# Patient Record
Sex: FEMALE | Race: WHITE
[De-identification: names, ages, dates, MRNs, and addresses within clinical notes are randomized per-mention and may not be internally consistent; named-entity substitution may affect disease eponyms.]

---

## 2020-07-07 ENCOUNTER — HOSPITAL ENCOUNTER (EMERGENCY)
Dept: HOSPITAL 26 - MED | Age: 65
Discharge: HOME | End: 2020-07-07
Payer: COMMERCIAL

## 2020-07-07 VITALS — HEIGHT: 63 IN | WEIGHT: 172 LBS | BODY MASS INDEX: 30.48 KG/M2

## 2020-07-07 VITALS — SYSTOLIC BLOOD PRESSURE: 136 MMHG | DIASTOLIC BLOOD PRESSURE: 59 MMHG

## 2020-07-07 DIAGNOSIS — I10: ICD-10-CM

## 2020-07-07 DIAGNOSIS — Z79.4: ICD-10-CM

## 2020-07-07 DIAGNOSIS — E11.9: ICD-10-CM

## 2020-07-07 DIAGNOSIS — Z79.899: ICD-10-CM

## 2020-07-07 DIAGNOSIS — N12: Primary | ICD-10-CM

## 2020-07-07 LAB
ALBUMIN FLD-MCNC: 3 G/DL (ref 3.4–5)
ANION GAP SERPL CALCULATED.3IONS-SCNC: 12.3 MMOL/L (ref 8–16)
APPEARANCE UR: CLEAR
AST SERPL-CCNC: 33 U/L (ref 15–37)
BASOPHILS # BLD AUTO: 0.1 K/UL (ref 0–0.22)
BASOPHILS NFR BLD AUTO: 1.2 % (ref 0–2)
BILIRUB SERPL-MCNC: 0.4 MG/DL (ref 0–1)
BILIRUB UR QL STRIP: (no result)
BUN SERPL-MCNC: 21 MG/DL (ref 7–18)
CHLORIDE SERPL-SCNC: 102 MMOL/L (ref 98–107)
CO2 SERPL-SCNC: 27.4 MMOL/L (ref 21–32)
COLOR UR: (no result)
CREAT SERPL-MCNC: 1.4 MG/DL (ref 0.6–1.3)
EOSINOPHIL # BLD AUTO: 0 K/UL (ref 0–0.4)
EOSINOPHIL NFR BLD AUTO: 0 % (ref 0–4)
ERYTHROCYTE [DISTWIDTH] IN BLOOD BY AUTOMATED COUNT: 15.2 % (ref 11.6–13.7)
GFR SERPL CREATININE-BSD FRML MDRD: 48 ML/MIN (ref 90–?)
GLUCOSE SERPL-MCNC: 156 MG/DL (ref 74–106)
GLUCOSE UR STRIP-MCNC: (no result) MG/DL
HCT VFR BLD AUTO: 38.9 % (ref 36–48)
HGB BLD-MCNC: 12.5 G/DL (ref 12–16)
HGB UR QL STRIP: NEGATIVE
LEUKOCYTE ESTERASE UR QL STRIP: (no result)
LYMPHOCYTES # BLD AUTO: 0.6 K/UL (ref 2.5–16.5)
LYMPHOCYTES NFR BLD AUTO: 11.3 % (ref 20.5–51.1)
MCH RBC QN AUTO: 27 PG (ref 27–31)
MCHC RBC AUTO-ENTMCNC: 32 G/DL (ref 33–37)
MCV RBC AUTO: 82.3 FL (ref 80–94)
MONOCYTES # BLD AUTO: 0.4 K/UL (ref 0.8–1)
MONOCYTES NFR BLD AUTO: 8.4 % (ref 1.7–9.3)
NEUTROPHILS # BLD AUTO: 4 K/UL (ref 1.8–7.7)
NEUTROPHILS NFR BLD AUTO: 79.1 % (ref 42.2–75.2)
NITRITE UR QL STRIP: POSITIVE
PH UR STRIP: 5.5 [PH] (ref 5–9)
PLATELET # BLD AUTO: 327 K/UL (ref 140–450)
POTASSIUM SERPL-SCNC: 4.7 MMOL/L (ref 3.5–5.1)
RBC # BLD AUTO: 4.72 MIL/UL (ref 4.2–5.4)
RBC #/AREA URNS HPF: (no result) /HPF (ref 0–5)
SODIUM SERPL-SCNC: 137 MMOL/L (ref 136–145)
WBC # BLD AUTO: 5 K/UL (ref 4.8–10.8)
WBC,URINE: (no result) /HPF (ref 0–5)

## 2020-07-07 NOTE — NUR
Patient discharged with v/s stable. Written and verbal after care instructions 
given and explained. 

Patient alert, oriented and verbalized understanding of instructions. 
Ambulatory with steady gait. All questions addressed prior to discharge. ID 
band removed. Patient advised to follow up with PMD. Rx of Cephalexin 500mg 
given. Patient educated on indication of medication including possible reaction 
and side effects. Opportunity to ask questions provided and answered.

## 2020-07-07 NOTE — NUR
64 Y/O FEMALE FROM HOME C/O LT FLANK PAIN, WAS SEEN AT PRIMARY CARE AND GIVEN 
CIPRO AND PYRIDIUM YESTERDAY FOR UTI. DENIES DYSURIA/URINARY FREQUENCY. DENIES 
FEVER/N/V/D. 9/10 ACHING PAIN TO FLANK. ABD SOFT, ROUND, NONTENDER. VSS 



MEDHX: DM

## 2020-10-16 ENCOUNTER — HOSPITAL ENCOUNTER (EMERGENCY)
Dept: HOSPITAL 26 - MED | Age: 65
Discharge: HOME | End: 2020-10-16
Payer: COMMERCIAL

## 2020-10-16 VITALS — HEIGHT: 63 IN | WEIGHT: 179 LBS | BODY MASS INDEX: 31.71 KG/M2

## 2020-10-16 VITALS — DIASTOLIC BLOOD PRESSURE: 84 MMHG | SYSTOLIC BLOOD PRESSURE: 172 MMHG

## 2020-10-16 VITALS — SYSTOLIC BLOOD PRESSURE: 172 MMHG | DIASTOLIC BLOOD PRESSURE: 84 MMHG

## 2020-10-16 DIAGNOSIS — E11.9: ICD-10-CM

## 2020-10-16 DIAGNOSIS — I10: ICD-10-CM

## 2020-10-16 DIAGNOSIS — L03.115: Primary | ICD-10-CM

## 2020-10-16 PROCEDURE — 93971 EXTREMITY STUDY: CPT

## 2020-10-16 PROCEDURE — 99284 EMERGENCY DEPT VISIT MOD MDM: CPT

## 2020-10-16 NOTE — NUR
DPatient discharged with v/s stable. Written and verbal after care instructions 
about cellulitis given and explained. 

Patient alert, oriented and verbalized understanding of instructions. 
Ambulatory with steady gait. All questions addressed prior to discharge. ID 
band removed. Patient advised to follow up with PMD. Rx of bactrim and keflex 
given. Patient educated on indication of medication including possible reaction 
and side effects. Opportunity to ask questions provided and answered.

## 2020-10-18 ENCOUNTER — HOSPITAL ENCOUNTER (EMERGENCY)
Dept: HOSPITAL 26 - MED | Age: 65
Discharge: HOME | End: 2020-10-18
Payer: COMMERCIAL

## 2020-10-18 VITALS — SYSTOLIC BLOOD PRESSURE: 139 MMHG | DIASTOLIC BLOOD PRESSURE: 63 MMHG

## 2020-10-18 VITALS — HEIGHT: 61 IN | BODY MASS INDEX: 32.1 KG/M2 | WEIGHT: 170 LBS

## 2020-10-18 VITALS — DIASTOLIC BLOOD PRESSURE: 63 MMHG | SYSTOLIC BLOOD PRESSURE: 139 MMHG

## 2020-10-18 DIAGNOSIS — E11.65: ICD-10-CM

## 2020-10-18 DIAGNOSIS — L03.115: Primary | ICD-10-CM

## 2020-10-18 DIAGNOSIS — Z79.4: ICD-10-CM

## 2020-10-18 DIAGNOSIS — I10: ICD-10-CM

## 2020-10-18 DIAGNOSIS — Z79.899: ICD-10-CM

## 2020-10-18 NOTE — NUR
Patient assesed and treated by DUKE Quiñones. Pt discharged with v/s stable. Written 
and verbal after care instructions about wound check given and explained. 

Patient alert, oriented and verbalized understanding of instructions. 
Ambulatory with steady gait. All questions addressed prior to discharge. ID 
band removed. Patient advised to follow up with PMD. Rx of keflex given. 
Patient educated on indication of medication including possible reaction and 
side effects. Opportunity to ask questions provided and answered.

## 2020-10-18 NOTE — NUR
-------------------------------------------------------------------------------

            *** Note dajaone in EDM - 10/18/20 at 1532 by MEDTOMÁS ***             

-------------------------------------------------------------------------------

Patient discharged with v/s stable. Written and verbal after care instructions 
about wound check given and explained. 

Patient alert, oriented and verbalized understanding of instructions. 
Ambulatory with steady gait. All questions addressed prior to discharge. ID 
band removed. Patient advised to follow up with PMD. Rx of keflex given. 
Patient educated on indication of medication including possible reaction and 
side effects. Opportunity to ask questions provided and answered.

## 2022-04-08 ENCOUNTER — HOSPITAL ENCOUNTER (EMERGENCY)
Dept: HOSPITAL 26 - MED | Age: 67
Discharge: HOME | End: 2022-04-08
Payer: COMMERCIAL

## 2022-04-08 VITALS — BODY MASS INDEX: 31.18 KG/M2 | WEIGHT: 176 LBS | HEIGHT: 63 IN

## 2022-04-08 VITALS — DIASTOLIC BLOOD PRESSURE: 58 MMHG | SYSTOLIC BLOOD PRESSURE: 155 MMHG

## 2022-04-08 VITALS — DIASTOLIC BLOOD PRESSURE: 61 MMHG | SYSTOLIC BLOOD PRESSURE: 141 MMHG

## 2022-04-08 DIAGNOSIS — R10.84: ICD-10-CM

## 2022-04-08 DIAGNOSIS — I10: ICD-10-CM

## 2022-04-08 DIAGNOSIS — E11.9: ICD-10-CM

## 2022-04-08 DIAGNOSIS — Z79.899: ICD-10-CM

## 2022-04-08 DIAGNOSIS — Z79.4: ICD-10-CM

## 2022-04-08 DIAGNOSIS — K59.00: Primary | ICD-10-CM

## 2022-04-08 DIAGNOSIS — R11.0: ICD-10-CM

## 2022-04-08 LAB
ALBUMIN FLD-MCNC: 3.5 G/DL (ref 3.4–5)
ANION GAP SERPL CALCULATED.3IONS-SCNC: 12.4 MMOL/L (ref 8–16)
AST SERPL-CCNC: 14 U/L (ref 15–37)
BASOPHILS # BLD AUTO: 0 K/UL (ref 0–0.22)
BASOPHILS NFR BLD AUTO: 0.5 % (ref 0–2)
BILIRUB SERPL-MCNC: 0.7 MG/DL (ref 0–1)
BUN SERPL-MCNC: 20 MG/DL (ref 7–18)
CHLORIDE SERPL-SCNC: 106 MMOL/L (ref 98–107)
CO2 SERPL-SCNC: 24 MMOL/L (ref 21–32)
CREAT SERPL-MCNC: 0.6 MG/DL (ref 0.6–1.3)
EOSINOPHIL # BLD AUTO: 0.2 K/UL (ref 0–0.4)
EOSINOPHIL NFR BLD AUTO: 2.3 % (ref 0–4)
ERYTHROCYTE [DISTWIDTH] IN BLOOD BY AUTOMATED COUNT: 14.5 % (ref 11.6–13.7)
GFR SERPL CREATININE-BSD FRML MDRD: 128 ML/MIN (ref 90–?)
GLUCOSE SERPL-MCNC: 136 MG/DL (ref 74–106)
HCT VFR BLD AUTO: 39.7 % (ref 36–48)
HGB BLD-MCNC: 12.8 G/DL (ref 12–16)
LIPASE SERPL-CCNC: 129 U/L (ref 73–393)
LYMPHOCYTES # BLD AUTO: 1.1 K/UL (ref 2.5–16.5)
LYMPHOCYTES NFR BLD AUTO: 15 % (ref 20.5–51.1)
MCH RBC QN AUTO: 26 PG (ref 27–31)
MCHC RBC AUTO-ENTMCNC: 32 G/DL (ref 33–37)
MCV RBC AUTO: 81.7 FL (ref 80–94)
MONOCYTES # BLD AUTO: 0.5 K/UL (ref 0.8–1)
MONOCYTES NFR BLD AUTO: 6.9 % (ref 1.7–9.3)
NEUTROPHILS # BLD AUTO: 5.7 K/UL (ref 1.8–7.7)
NEUTROPHILS NFR BLD AUTO: 75.3 % (ref 42.2–75.2)
PLATELET # BLD AUTO: 315 K/UL (ref 140–450)
POTASSIUM SERPL-SCNC: 4.4 MMOL/L (ref 3.5–5.1)
RBC # BLD AUTO: 4.86 MIL/UL (ref 4.2–5.4)
SODIUM SERPL-SCNC: 138 MMOL/L (ref 136–145)
WBC # BLD AUTO: 7.5 K/UL (ref 4.8–10.8)

## 2022-04-08 PROCEDURE — 96372 THER/PROPH/DIAG INJ SC/IM: CPT

## 2022-04-08 PROCEDURE — 99283 EMERGENCY DEPT VISIT LOW MDM: CPT

## 2022-04-08 PROCEDURE — 83690 ASSAY OF LIPASE: CPT

## 2022-04-08 PROCEDURE — 36415 COLL VENOUS BLD VENIPUNCTURE: CPT

## 2022-04-08 PROCEDURE — 85025 COMPLETE CBC W/AUTO DIFF WBC: CPT

## 2022-04-08 PROCEDURE — 80053 COMPREHEN METABOLIC PANEL: CPT

## 2022-04-08 NOTE — NUR
65 Y/O FEMALE C/O CONSTIPATION AND NAUSEA X 1 WEEK. PT DENIES ABDOMINAL PAIN, 
VOMITING. PT STATES LAST BOWEL MOVEMENT WAS 1 WEEK AGO. PT DENIES SOB, CHEST 
PAIN, FEVER OR CHILLS. PT DENIES TAKING MEDICATION PRIOR TO ARRIVAL. PT IS 
ALERT AND ORIENTED X4. BED IN LOWEST POSITION. BED RAIL X1.





PMH: HTN, DM

MEDS: INSULIN, SIMVASTATIN

NKA

## 2022-04-08 NOTE — NUR
Patient discharged with v/s stable. Written and verbal after care instructions 
given and explained. 

Patient alert, oriented and verbalized understanding of instructions. 
Ambulatory with steady gait. All questions addressed prior to discharge. ID 
band removed. Patient advised to follow up with PMD. Rx of 
COLACE,MIRALAX,NAPROSYN given. Patient educated on indication of medication 
including possible reaction and side effects. Opportunity to ask questions 
provided and answered.

## 2022-05-03 ENCOUNTER — HOSPITAL ENCOUNTER (INPATIENT)
Dept: HOSPITAL 26 - MED | Age: 67
LOS: 3 days | Discharge: ANOTHER HOSPITAL | DRG: 280 | End: 2022-05-06
Attending: FAMILY MEDICINE | Admitting: FAMILY MEDICINE
Payer: COMMERCIAL

## 2022-05-03 VITALS — DIASTOLIC BLOOD PRESSURE: 65 MMHG | SYSTOLIC BLOOD PRESSURE: 152 MMHG

## 2022-05-03 VITALS — WEIGHT: 182 LBS | BODY MASS INDEX: 33.49 KG/M2 | HEIGHT: 62 IN

## 2022-05-03 VITALS — SYSTOLIC BLOOD PRESSURE: 105 MMHG | DIASTOLIC BLOOD PRESSURE: 78 MMHG

## 2022-05-03 DIAGNOSIS — J96.01: ICD-10-CM

## 2022-05-03 DIAGNOSIS — E44.1: ICD-10-CM

## 2022-05-03 DIAGNOSIS — E11.65: ICD-10-CM

## 2022-05-03 DIAGNOSIS — D64.9: ICD-10-CM

## 2022-05-03 DIAGNOSIS — I21.4: Primary | ICD-10-CM

## 2022-05-03 DIAGNOSIS — E87.1: ICD-10-CM

## 2022-05-03 DIAGNOSIS — I50.31: ICD-10-CM

## 2022-05-03 DIAGNOSIS — Z20.822: ICD-10-CM

## 2022-05-03 DIAGNOSIS — I11.0: ICD-10-CM

## 2022-05-03 DIAGNOSIS — E83.39: ICD-10-CM

## 2022-05-03 DIAGNOSIS — E78.5: ICD-10-CM

## 2022-05-03 DIAGNOSIS — D68.9: ICD-10-CM

## 2022-05-03 DIAGNOSIS — I25.10: ICD-10-CM

## 2022-05-03 LAB
ALBUMIN FLD-MCNC: 3.2 G/DL (ref 3.4–5)
AMYLASE SERPL-CCNC: 11 U/L (ref 25–115)
ANION GAP SERPL CALCULATED.3IONS-SCNC: 12 MMOL/L (ref 8–16)
AST SERPL-CCNC: 62 U/L (ref 15–37)
BASOPHILS # BLD AUTO: 0 K/UL (ref 0–0.22)
BASOPHILS NFR BLD AUTO: 0.3 % (ref 0–2)
BILIRUB SERPL-MCNC: 0.8 MG/DL (ref 0–1)
BUN SERPL-MCNC: 16 MG/DL (ref 7–18)
CHLORIDE SERPL-SCNC: 99 MMOL/L (ref 98–107)
CHOLEST/HDLC SERPL: 3.6 {RATIO} (ref 1–4.5)
CO2 SERPL-SCNC: 27.5 MMOL/L (ref 21–32)
CREAT SERPL-MCNC: 0.8 MG/DL (ref 0.6–1.3)
EOSINOPHIL # BLD AUTO: 0.1 K/UL (ref 0–0.4)
EOSINOPHIL NFR BLD AUTO: 1.1 % (ref 0–4)
ERYTHROCYTE [DISTWIDTH] IN BLOOD BY AUTOMATED COUNT: 14.9 % (ref 11.6–13.7)
GFR SERPL CREATININE-BSD FRML MDRD: 92 ML/MIN (ref 90–?)
GLUCOSE SERPL-MCNC: 405 MG/DL (ref 74–106)
HCT VFR BLD AUTO: 37.4 % (ref 36–48)
HDLC SERPL-MCNC: 51 MG/DL (ref 40–60)
HGB BLD-MCNC: 12.1 G/DL (ref 12–16)
LDLC SERPL CALC-MCNC: 115 MG/DL (ref 60–100)
LIPASE SERPL-CCNC: 41 U/L (ref 73–393)
LIPASE SERPL-CCNC: 46 U/L (ref 73–393)
LYMPHOCYTES # BLD AUTO: 0.6 K/UL (ref 2.5–16.5)
LYMPHOCYTES NFR BLD AUTO: 7.1 % (ref 20.5–51.1)
MAGNESIUM SERPL-MCNC: 2.1 MG/DL (ref 1.8–2.4)
MCH RBC QN AUTO: 27 PG (ref 27–31)
MCHC RBC AUTO-ENTMCNC: 32 G/DL (ref 33–37)
MCV RBC AUTO: 81.9 FL (ref 80–94)
MONOCYTES # BLD AUTO: 0.5 K/UL (ref 0.8–1)
MONOCYTES NFR BLD AUTO: 5.9 % (ref 1.7–9.3)
NEUTROPHILS # BLD AUTO: 7.7 K/UL (ref 1.8–7.7)
NEUTROPHILS NFR BLD AUTO: 85.6 % (ref 42.2–75.2)
PHOSPHATE SERPL-MCNC: 6.1 MG/DL (ref 2.5–4.9)
PLATELET # BLD AUTO: 339 K/UL (ref 140–450)
POTASSIUM SERPL-SCNC: 4.5 MMOL/L (ref 3.5–5.1)
PROTHROMBIN TIME: 9.6 SECS (ref 10.8–13.4)
RBC # BLD AUTO: 4.57 MIL/UL (ref 4.2–5.4)
SODIUM SERPL-SCNC: 134 MMOL/L (ref 136–145)
T4 FREE SERPL-MCNC: 1.08 NG/DL (ref 0.76–1.46)
TRIGL SERPL-MCNC: 92 MG/DL (ref 30–150)
TSH SERPL DL<=0.05 MIU/L-ACNC: 2.77 UIU/ML (ref 0.34–3.74)
WBC # BLD AUTO: 9.1 K/UL (ref 4.8–10.8)

## 2022-05-03 PROCEDURE — 5A0935A ASSISTANCE WITH RESPIRATORY VENTILATION, LESS THAN 24 CONSECUTIVE HOURS, HIGH NASAL FLOW/VELOCITY: ICD-10-PCS

## 2022-05-03 NOTE — NUR
PATIENT AT NOW REST. PATIENT FEELS BETTER BUT STILL HAS DIFFICULTY CATCHING 
BREATH. SLIGHTLY TACHYCARDIC, AND INCREASED RR.

## 2022-05-03 NOTE — NUR
patient desaturating to 84% on 6L NC. called for RT. placed on simple face 
mask. patient diaphoretic, "feels desperation to breathe", and "i can't calm 
down". ERMD made aware.

## 2022-05-03 NOTE — NUR
PATIENT WAS ABLE TO CALM DOWN BY THERAPEUTIC COMMUNICATION AND TOUCH. BUT FEELS 
DISCOMFORT IN THE ABDOMEN. PATIENT HAS INCREASE HEART RATE, INCREASE RR, AND 
OXYGEN AT 89% 4L NC. ERMD MADE AWARE.

## 2022-05-03 NOTE — NUR
patient back to the bathroom and hooked patient back on the monitor. safety 
measures are in place, and will continue to monitor patient. patient aaox4 and 
c/o difficulty breathing upon exertion. ERMD made aware

## 2022-05-03 NOTE — NUR
patient bib self c/o c/p, sob, ha started thursday. patient clammy. has taken 
tylenol and ibuprofen with no relief. difficulty breathing upon exertion- has 
trouble catching breath.



pmh: high cholesterol, DM, htn

nka

## 2022-05-04 VITALS — DIASTOLIC BLOOD PRESSURE: 74 MMHG | SYSTOLIC BLOOD PRESSURE: 138 MMHG

## 2022-05-04 VITALS — SYSTOLIC BLOOD PRESSURE: 121 MMHG | DIASTOLIC BLOOD PRESSURE: 73 MMHG

## 2022-05-04 VITALS — SYSTOLIC BLOOD PRESSURE: 120 MMHG | DIASTOLIC BLOOD PRESSURE: 68 MMHG

## 2022-05-04 VITALS — SYSTOLIC BLOOD PRESSURE: 120 MMHG | DIASTOLIC BLOOD PRESSURE: 69 MMHG

## 2022-05-04 VITALS — DIASTOLIC BLOOD PRESSURE: 68 MMHG | SYSTOLIC BLOOD PRESSURE: 120 MMHG

## 2022-05-04 VITALS — DIASTOLIC BLOOD PRESSURE: 70 MMHG | SYSTOLIC BLOOD PRESSURE: 142 MMHG

## 2022-05-04 VITALS — DIASTOLIC BLOOD PRESSURE: 56 MMHG | SYSTOLIC BLOOD PRESSURE: 110 MMHG

## 2022-05-04 LAB
ANION GAP SERPL CALCULATED.3IONS-SCNC: 13 MMOL/L (ref 8–16)
APPEARANCE UR: (no result)
BARBITURATES UR QL SCN: NEGATIVE NG/ML
BASOPHILS # BLD AUTO: 0 K/UL (ref 0–0.22)
BASOPHILS NFR BLD AUTO: 0.4 % (ref 0–2)
BENZODIAZ UR QL SCN: NEGATIVE NG/ML
BILIRUB UR QL STRIP: NEGATIVE
BUN SERPL-MCNC: 15 MG/DL (ref 7–18)
BZE UR QL SCN: NEGATIVE NG/ML
CANNABINOIDS UR QL SCN: NEGATIVE NG/ML
CHLORIDE SERPL-SCNC: 101 MMOL/L (ref 98–107)
CO2 SERPL-SCNC: 28.3 MMOL/L (ref 21–32)
COLOR UR: (no result)
CREAT SERPL-MCNC: 0.8 MG/DL (ref 0.6–1.3)
EOSINOPHIL # BLD AUTO: 0 K/UL (ref 0–0.4)
EOSINOPHIL NFR BLD AUTO: 0.5 % (ref 0–4)
ERYTHROCYTE [DISTWIDTH] IN BLOOD BY AUTOMATED COUNT: 14.6 % (ref 11.6–13.7)
GFR SERPL CREATININE-BSD FRML MDRD: 92 ML/MIN (ref 90–?)
GLUCOSE SERPL-MCNC: 333 MG/DL (ref 74–106)
GLUCOSE UR STRIP-MCNC: (no result) MG/DL
HCT VFR BLD AUTO: 33.9 % (ref 36–48)
HGB BLD-MCNC: 11 G/DL (ref 12–16)
HGB UR QL STRIP: NEGATIVE
LEUKOCYTE ESTERASE UR QL STRIP: NEGATIVE
LYMPHOCYTES # BLD AUTO: 1 K/UL (ref 2.5–16.5)
LYMPHOCYTES NFR BLD AUTO: 11.3 % (ref 20.5–51.1)
MCH RBC QN AUTO: 27 PG (ref 27–31)
MCHC RBC AUTO-ENTMCNC: 32 G/DL (ref 33–37)
MCV RBC AUTO: 81.9 FL (ref 80–94)
MONOCYTES # BLD AUTO: 0.6 K/UL (ref 0.8–1)
MONOCYTES NFR BLD AUTO: 6.7 % (ref 1.7–9.3)
NEUTROPHILS # BLD AUTO: 7.1 K/UL (ref 1.8–7.7)
NEUTROPHILS NFR BLD AUTO: 81.1 % (ref 42.2–75.2)
NITRITE UR QL STRIP: NEGATIVE
OPIATES UR QL SCN: NEGATIVE NG/ML
PCP UR QL SCN: NEGATIVE NG/ML
PH UR STRIP: 5.5 [PH] (ref 5–9)
PLATELET # BLD AUTO: 318 K/UL (ref 140–450)
POTASSIUM SERPL-SCNC: 4.3 MMOL/L (ref 3.5–5.1)
RBC # BLD AUTO: 4.14 MIL/UL (ref 4.2–5.4)
SODIUM SERPL-SCNC: 138 MMOL/L (ref 136–145)
WBC # BLD AUTO: 8.7 K/UL (ref 4.8–10.8)

## 2022-05-04 RX ADMIN — INSULIN LISPRO PRN UNITS: 100 INJECTION, SOLUTION INTRAVENOUS; SUBCUTANEOUS at 20:37

## 2022-05-04 RX ADMIN — INSULIN LISPRO PRN UNITS: 100 INJECTION, SOLUTION INTRAVENOUS; SUBCUTANEOUS at 17:10

## 2022-05-04 RX ADMIN — Medication SCH MG: at 20:50

## 2022-05-04 RX ADMIN — INSULIN GLARGINE SCH UNITS: 100 INJECTION, SOLUTION SUBCUTANEOUS at 20:43

## 2022-05-04 RX ADMIN — PANTOPRAZOLE SODIUM SCH MG: 40 TABLET, DELAYED RELEASE ORAL at 09:08

## 2022-05-04 RX ADMIN — Medication SCH DEV: at 07:04

## 2022-05-04 RX ADMIN — HEPARIN SODIUM SCH MLS/HR: 5000 INJECTION, SOLUTION INTRAVENOUS at 16:58

## 2022-05-04 RX ADMIN — INSULIN LISPRO PRN UNITS: 100 INJECTION, SOLUTION INTRAVENOUS; SUBCUTANEOUS at 12:38

## 2022-05-04 RX ADMIN — HEPARIN SODIUM SCH MLS/HR: 5000 INJECTION, SOLUTION INTRAVENOUS at 10:15

## 2022-05-04 RX ADMIN — HEPARIN SODIUM SCH MLS/HR: 5000 INJECTION, SOLUTION INTRAVENOUS at 21:59

## 2022-05-04 RX ADMIN — Medication SCH DEV: at 17:09

## 2022-05-04 RX ADMIN — INSULIN LISPRO PRN UNITS: 100 INJECTION, SOLUTION INTRAVENOUS; SUBCUTANEOUS at 07:02

## 2022-05-04 RX ADMIN — HEPARIN SODIUM SCH MLS/HR: 5000 INJECTION, SOLUTION INTRAVENOUS at 02:39

## 2022-05-04 RX ADMIN — Medication SCH DEV: at 20:47

## 2022-05-04 RX ADMIN — Medication SCH DEV: at 11:30

## 2022-05-04 RX ADMIN — ATORVASTATIN CALCIUM SCH MG: 80 TABLET, FILM COATED ORAL at 12:45

## 2022-05-04 NOTE — NUR
Patient will be admitted to care of Jessica RUBALCAVA. Admited to Telemetry.  Will go to 
room 123B. Belongings list completed.  Report to Rosy SOUZA.

## 2022-05-04 NOTE — NUR
RECEIVED REPORT FROM CRYSTAL LEPE AT BEDSIDE FOR CONTINUITY OF CARE, PT SITTING UP AT 
THE EDGE OF BED  RESPIRATIONS EVEN AND UNLABORED ON ROOM AIR. PT HAS NO C/O VOICED. PT HAS  
A LEFT AC 18 GUAGE INTACT AND ASYMPTOMATIC RUNNING A HEPARIN GTT RUNNING AT 1350 UNITS. NEXT 
PTT IS 2300. PT HAS BEDSIDE COMMODE  NEXT TO BED AND HAS CONSUMED MOST OF HER DINNER. SHE 
HAS NO C/O VOICED AT THIS TIME. ALL FALLS AND ASPIRATION PRECAUTIONS IN PLACE.

## 2022-05-04 NOTE — NUR
PT GIVEN ORDERED LOPRESSOR. AS WELL AS HUMALOG AS PER S/S AND  ORDERED LANTUS. EXPLAINED TO 
PT PURPOSE OF MEDICATION, REINFORCEMENT NEEDED. PT VERBALIZED UNDERSTANDING. PT VOIDED 150 
OF CLOUDY URINE. PT DNEIS ANY PAIN OR DISTRESS ALL ORDERED PRECAUTIONS IN PLACE.

## 2022-05-04 NOTE — NUR
RECIEVED PT FROM ER/ GURNEY  , ON HI FLOW , ON LABORED BREATHING , O2 SAT 96 % , AAOX4 , IV 
SITE INTACT AND PATENT , WALKS TO BED W/ ASSIST , ADM. ASSESSMENT - DONE , PUT ON FALL RISK 
PRECAUTION - REMINDS PT TO USE THE CALL LIGHT EVERYTHING SHE WANTS TO GO USE BEDSIDE COMMODE 
,  WILL PROVIDE BEDSIDE COMMODE . PLAN OF CARE DISCUSSED AND VERBALIZES UNDERSTANDING , ON 
TELE MONITOR - SR . WILL CONT. TO MONITOR

## 2022-05-04 NOTE — NUR
DC PLANNING 

PATIENT IS A 66 YEAR OLD  FEMALE ADMITTED O THE [facility]/ED ON 05/03/2022 DUE 
COMPLAINT'S OF SHORTNESS OF BREATH FOR ABOUT 3 DAYS.  PATIENT ALSO REPORTED HAVING A COUGH. 
PATIENT HAS MEDICAL HX. OF DIABETES AND HYPERTENSION. (PATIENT IS Yi SPEAKING).

LEO AND CM MET WITH PATIENT AT BEDSIDE TO DISCUSS AND GATHER PATIENT'S COLLATERAL 
INFORMATION. WHILE SHE WAS IN THE PHONE WITH HER DAUGHTER [place]. PATIENT WAS INFORMED 
DURING THESE MEETING THAT SHE WILL BE  

AND TRANSPORTED TO [facility] FOR A TEST APPOINTMENT AND  THEN SHE WILL RETURN TO 
[facility] PER DOCTOR'S ORDERS TO FOLLOW UP WITH HER CARE HERE AT THIS HOSPITAL, 
PATIENT UNDERSTOOD AND AGREED. PATIENT THEN HANG UP THE PHONE WITH HER DAUGHTER [place] AND 
REPORTED LIVING AT HOME WITH HER DAUGHTER ALEX SCHERER [phone] AND HER TWO 
GRANDCHILDREN AGES 5 AND 7 YEARS OLD.  PATIENT STATED HAVING A BIG AND GOOD FAMILY SUPPORT, 
BUT REPORTED THAT HER EMERGENCY AND MEDICAL DECISION MAKER IS HER DAUGHTER ALEX AND HER 
SON THAT LIVES IN [place]. PATIENT STATED NOT HAVING ADVANCE DIRECTIVES AND WAS INTERESTED 
ON GETTING INF. FORMS PROVIDED BY LEO.  PATIENT REPORTED BEEN ACTIVE AND INDEPENDENT AT HOME 
AND STILL HAVING A JOB AT [employer].  PER PATIENT SHE HAS NO A NEBULIZER AT HOME AS 
HER ONLY DME. PATIENT REPORTED NOT HAVING ISSUES GETTING OR TAKING ANY MEDICATIONS 
PRESCRIBED BY HER DOCTOR PCP ANI DIAZ FROM [facility] IN [place], HOWEVER WAS 
NOT HAPPY WITH HER PCP AND REQUESTED A REFERRAL TO DR. VIVAS. PATIENT STATED THAT SHE GETS 
HER MEDICATIONS FROM THE [facility] NEAR HER HOME IN [place]. LEO EXPLAINED TO PATIENT THE NEED 
TO FOLLOW UP WITH AN APPOINTMENT WITHIN 5-7 DAYS WITH HER PCP AFTER HER DC FOR [facility]. PATIENT 
AGREED FOR SW TO MAKE HER FOLLOW UP APPOINTMENT; WITH PRIMARY DOCTOR AFTER SHE DISCHARGES 
FROM [facility]. PATIENT STATED THAT HER DAUGHTER WILL BE ASSISTING HER WITH TRANSPORTATION BACK 
HOME WHEN SHE IS READY FOR DISCHARGE. SW WILL FOLLOW UP WITH PATIENT AS NEEDED.

## 2022-05-04 NOTE — NUR
PER ER NURSE THE HEPARIN 5000 U  TIV PRIOR TO FLOOR IS AS BOLUS PRIOR TO HEPARIN DRIP - PER 
HER THAT IS THE ORDERED BY ER DOCTOR . - INFORM PHARMACIST ABOUT THIS SO THAT HE WILL 
VERIFIED THE HEPARIN DRIP .

## 2022-05-04 NOTE — NUR
AWAKE , O2 SAT 97 % , ON HEPARIN DRIP - NO BLEEDING NOTED ELSEWHERE IN THE BODY , CLEAR U.O 
, WILL CONT. TO MONITOR .

## 2022-05-04 NOTE — NUR
PT TAKEN OFF OF HIGH FLOW AND AMBULATED WITH PHYSICAL THERAPIST. ON ROOM AIR AND AMBULATION 
LOWEST SPO2 93%. PT NOT SOB AND NOT IN RESPIRATORY DISTRESS. PT ALSO STATES SHE DOES NOT 
FEEL SOB. WILL CONTINUE TO MONITOR.

## 2022-05-04 NOTE — NUR
RECEIVED REPORT FROM NIGHT SHIFT NURSE FOR CONTINUITY OF CARE. PATIENT SITTING IN BED ON 
HIGH FLOW, NO DISTRESS NOTED. DENIES ANY PAIN, DYSPNEA. IV SITE INTACT, PATENT, AND INFUSING 
HEPARIN DRIP PER PROTOCOL. NO S/S OF ACTIVE BLEEDING. REVIEWED PLAN OF CARE WITH PATIENT. 
VERBALIZED UNDERSTANDING. SAFETY MEASURES IN PLACE, CALL LIGHT WITHIN REACH. WILL CONTINUE 
TO MONITOR.

## 2022-05-04 NOTE — NUR
PATIENT HAS BEEN SCREENED AND CATEGORIZED AS MODERATE NUTRITION RISK. PATIENT WILL BE SEEN 
WITHIN 3-5 DAYS OF ADMISSION.



5/4/225/8/22



GEORGI NELSON RD

## 2022-05-04 NOTE — NUR
DC PLANNING:

ORDER RECEIVED FOR PATIENT TO GO TO [facility] IN AM FOR 7:15 AM HEART CATH.

CLINICAL PACKET FAXED TO [place], LIZET SPOKE WITH ISA AT [place] AND CONFIRMED THE 
PROCEDURE TIME.

TRANSPORT REQUEST SENT TO [facility] FOR 0530  TO GO TO [place] TOMORROW MORNING, AND FOR 12 
NOON  AT [facility] TO RETURN THE PATIENT TO [facility].

LIZET SPOKE WITH THE PATIENT AND HER DAUGHTER IN [place] AT BEDSIDE AND EXPLAINED THAT PATIENT IS 
GOING FOR THIS PROCEDURE TOMORROW MORNING AND ENDORSED THE  TIME AND THAT PATIENT IS 
PLANNED TO RETURN TO [facility]. CM ALSO ENDORSED THIS TO THE CHARGE NURSE RYNE 
AND TO THE PATIENTS NURSE CRYSTAL.

CM WILL FOLLOW.

-------------------------------------------------------------------------------

Addendum: 05/04/22 at 1517 by Harper Shultz CM

-------------------------------------------------------------------------------

DC PLANNING:

LIZET AND LEO MET WITH THE PATIENT AT BEDSIDE, HOME ADDRESS AND PHONE NUMBER WERE CONFIRMED. THE 
PATIENT WORKS FULL TIME AT [employer] AND IS INDEPENDENT IN ALL ACTIVITIES. NO H/O HOME 
HEALTH OR SNF PLACEMENT, HAS DME OF A NEBULIZER AND GLUCOMETER. SHE LIVES IN A MOBILE HOME 
WITH HER DAUGHTER AND 2 GRANDCHILDREN AND DRIVES HERSELF TO WORK AND TO RUN [employer]. THE 
PATIENT GOES TO [facility] FOR MEDICAL CARE AND OVERSIGHT BUT IS NOT HAPPY WITH HER 
PMD DR DIAZ, ASKED FOR A REFERRAL TO DR VIVAS WHICH WILL BE DONE.

CM WILL FOLLOW.

-------------------------------------------------------------------------------

Addendum: 05/04/22 at 1614 by Harper Shultz CM

-------------------------------------------------------------------------------

DC PLANNING:

TRANSPORT ARRANGED WITH [facility] LEVEL WITH TELEMETRY MONITORING FOR PATIENTS HEART CATH AT 
[place] TOMORROW MORNING, 5/5. PATIENT WILL BE PICKED UP AT 0500, RETURN TRIP PLACED ON 
WILL CALL WITH [facility].

[facility] AUTH NUMBER FOR [facility] TRANSPORT IS [id number].

LIZET WILL FOLLOW.

-------------------------------------------------------------------------------

Addendum: 05/05/22 at 1406 by Harper Shultz CM

-------------------------------------------------------------------------------

DC PLANNING:

LIZET SPOKE WITH DR ALVAREZ REGARDING CARDIAC CATH RESULTS, PATIENT WILL NEED CABG, LIZET ASKED TO 
REFER PATIENT TO [facility] WITH DR JIMMIE DANIELSON ACCEPTING. LIEZT ENDORSED THAT [facility] HAS NOT HAD BEDS, 
DR ALVAREZ STATES THAT PATIENT CAN BE REFERRED TO [facility], DR CHRIS DELA CRUZ WOULD FOLLOW 
THERE.

LIZET FAXED REFERRALS TO [facility] AND [facility], SPOKE WITH THE HOUSE SUPERVISOR AT [facility], NO 
TELE BEDS AT THIS TIME BUT THEY WILL REVIEW THE REFERRAL.

LIZET WILL FOLLOW.

-------------------------------------------------------------------------------

Addendum: 05/05/22 at 1613 by Harper Shultz CM

-------------------------------------------------------------------------------

DC PLANNING:

LIZET SPOKE WITH ASHLEY AT [facility], THEY ARE ON CABG DIVERSION AND ARE 
UNABLE TO ACCEPT THE PATIENT. LIZTE THEN SPOKE WITH CAROLYN AT [facility] ([phone]) WHO STATES THAT 
THEY ARE WAITING FOR THE ACCEPTING CARDIOLOGIST TO CALL. LIZET LEFT A MESSAGE FOR JIMMIE DANIELSON 
ASKING HIM TO CALL [facility] TO ACCEPT, DR ALVAREZ AWARE OF PLAN. [facility] TRANSPORT PLACED ON WILL CALL 
([phone]), PLAN ENDORSED TO THE PATIENTS NURSE HERLINDA. NURSING WILL NEED TO GIVE [facility] THE 
ROOM NUMBER AT [facility] WHEN THEY ACTIVATE THE TRANSPORT. LIZET LEFT VM FOR THE PATIENTS DAUGHTER 
REGARDING POSSIBLE TRANSFER AND ALSO SPOKE WITH THE PATIENT AT BEDSIDE.

LIZET WILL FOLLOW.

## 2022-05-04 NOTE — NUR
PT TRANSFERRED FROM ED TO  123  PT TOLERATED TRANSPORT WELL   PT REMAINS ON HFNC AT THIS 
TIME 36L 45% WITH CURRENT SPO2 97%  HR 96   HF PLUGGED INTO RED OUTLET    WILL CONTINUE TO 
MONITOR

## 2022-05-05 VITALS — SYSTOLIC BLOOD PRESSURE: 133 MMHG | DIASTOLIC BLOOD PRESSURE: 63 MMHG

## 2022-05-05 VITALS — DIASTOLIC BLOOD PRESSURE: 71 MMHG | SYSTOLIC BLOOD PRESSURE: 149 MMHG

## 2022-05-05 VITALS — DIASTOLIC BLOOD PRESSURE: 49 MMHG | SYSTOLIC BLOOD PRESSURE: 99 MMHG

## 2022-05-05 VITALS — SYSTOLIC BLOOD PRESSURE: 127 MMHG | DIASTOLIC BLOOD PRESSURE: 57 MMHG

## 2022-05-05 LAB
T3RU NFR SERPL: 25 % (ref 24–39)
T4 SERPL-MCNC: 9.1 UG/DL (ref 4.5–12)

## 2022-05-05 RX ADMIN — FUROSEMIDE SCH MG: 10 INJECTION, SOLUTION INTRAMUSCULAR; INTRAVENOUS at 16:38

## 2022-05-05 RX ADMIN — Medication SCH DEV: at 16:38

## 2022-05-05 RX ADMIN — Medication SCH MG: at 21:06

## 2022-05-05 RX ADMIN — PANTOPRAZOLE SODIUM SCH MG: 40 TABLET, DELAYED RELEASE ORAL at 09:00

## 2022-05-05 RX ADMIN — HEPARIN SODIUM SCH MLS/HR: 5000 INJECTION, SOLUTION INTRAVENOUS at 00:04

## 2022-05-05 RX ADMIN — ATORVASTATIN CALCIUM SCH MG: 80 TABLET, FILM COATED ORAL at 09:00

## 2022-05-05 RX ADMIN — INSULIN GLARGINE SCH UNITS: 100 INJECTION, SOLUTION SUBCUTANEOUS at 20:56

## 2022-05-05 RX ADMIN — FUROSEMIDE SCH MG: 10 INJECTION, SOLUTION INTRAMUSCULAR; INTRAVENOUS at 09:00

## 2022-05-05 RX ADMIN — Medication SCH DEV: at 07:30

## 2022-05-05 RX ADMIN — INSULIN LISPRO PRN UNITS: 100 INJECTION, SOLUTION INTRAVENOUS; SUBCUTANEOUS at 16:39

## 2022-05-05 RX ADMIN — Medication SCH DEV: at 21:02

## 2022-05-05 RX ADMIN — Medication SCH MG: at 09:00

## 2022-05-05 RX ADMIN — Medication SCH DEV: at 11:28

## 2022-05-05 RX ADMIN — INSULIN LISPRO PRN UNITS: 100 INJECTION, SOLUTION INTRAVENOUS; SUBCUTANEOUS at 20:59

## 2022-05-05 NOTE — NUR
PATIENT SCHEDULED FOR CARDIAC CATHETERIZATION AT [facility] TODAY. 
AWAITING TO BE .

## 2022-05-05 NOTE — NUR
RECEIVED REPORT FROM NIGHT SHIFT FOR CONTINUITY OF CARE. PATIENT IS IN [place] FOR CARDIAC 
CATH AND  AT 5 AM PER NIGHT SHIFT.

## 2022-05-05 NOTE — NUR
RECEIVED REPORT FROM ANDREINA SOUZA FOR CONTINUITY OF CARE. PATIENT HEPARIN DRIP IS RUNNING AT 
1500UNITS. NO DISTRESS NOTED. WILL CONTINUE TO MONITOR

## 2022-05-05 NOTE — NUR
PATIENT LEFT THE FACILITY WITH TWO [facility] PERSONNEL HEADING TO [facility]. 
PATIENT TOOK HER PURSE, CELLPHONE, SWEATER AND EYE GLASSES WITH HER. ALL PAPERWORK IS GIVEN 
TO [facility] PERSONNEL.

## 2022-05-05 NOTE — NUR
RECEIVED REPORT FROM RN DAYSHIFT NURSE AT BEDSIDE FOR CONTINUITY OF CARE, PT SITTING UP IN 
BED SHE IS AOX4 AND ON 3 LITERS OF 02 VIA N/C. OTHER V/S AS FOLLOWS: T 97.6 P 70 R 20 B/P 
127/57 02 97% ON 3 LITERS VIA N/C PT FINGERSTICK . FAMILY AT BEDSIDE, REMINDED THAT PT 
HAS ORDERERS TO TRANSFER TO [place] FOR HIGHER LEVEL OF CARE, PT AND FAMILY VERBALIZED 
UNDERSTANDING. ALL ORDERED PRECAUTIONS IN PLACE.

## 2022-05-05 NOTE — NUR
PATIENT CAME BACK FROM CARDIAC CATH AT [facility], REPORT RECEIVED. PATIENT 
RESTING IN BED, INSTRUCTED THAT SHE WILL STAY IN BED UNTIL 1345. VITALS STABLE. NOTIFIED DR. ALVAREZ AND MADE AWARE. DENIES PAIN NOT IN ANY DISTRESS NOTED. WILL CONTINUE TO MONITOR.

## 2022-05-05 NOTE — NUR
PT WAS GIVEN LANTUS 35 UNITS SQ AS ORDERED FOR HYPERGLYCEMIA. PT ALSO GIVEN 6 UNITS OF 
HUMALOG COVERAGE AS PER S/S. PT ALSO GIVEN ORDERED LOVENOX SQ IN THE ABDOMEN. EDUCATION 
PROVIDED AT BEDSIDE REGARDING MEDICATION, REINFORCEMENT NEEDED, HOWEVER, PT VERBALIZED 
UNDERSTANDING. PT WAS ALSO GIVEN ORDERED LOPRESSOR. ALL ORDERED  PRECAUTIONS IN PLACE.

## 2022-05-05 NOTE — NUR
RESTING IN BED, SON AT BEDSIDE, MADE AWARE OF THE PLAN OF CARE. WAITING FOR BED AT [facility]. 
DENIES CHEST PAIN. APOLONIA ENDORSE TO THE NEXT SHIFT.

## 2022-05-05 NOTE — NUR
LABS DONE AT BEDSIDE. PTT WAS 42.3 AS PER PROTOCOL. PT WAS GIVEN HEPARIN BOLUS AND INCREASED 
RATE . NEW RATE IS 1500 UNITS.

## 2022-05-05 NOTE — NUR
RECEIVED A CALL FROM [facility] AND SPOKE TO HARLEY CATRER AND SHE GAVE REPORT 
REGARDING PATIENT STATUS. PATIENT ETA IS 1 PM.

## 2022-05-06 VITALS — DIASTOLIC BLOOD PRESSURE: 57 MMHG | SYSTOLIC BLOOD PRESSURE: 109 MMHG

## 2022-05-06 VITALS — SYSTOLIC BLOOD PRESSURE: 101 MMHG | DIASTOLIC BLOOD PRESSURE: 57 MMHG

## 2022-05-06 NOTE — NUR
PT MIDNIGHT V/S TAKEN CNA REPORTED A B/P OF 60/32, PT REPOSITIONED IN BED AND B/P RETAKEN 
AND IT WAS 77/42, TEXTED MD WHOM REPLIED TO GIVEN A BOLUS. CHARGE RETOOK B/P AND IT WAS 
101/57. MD SAID TO GIVEN A 500 MLS BOLUS OF N/S AND RETAKE IN 1/2 HR.  PT HAS NO C/O OF 
DISCOMFORT. WILL RETAKE B/P IN 1/2 HR AFTER THE BOLUS IS GIVEN .

## 2022-05-06 NOTE — NUR
PINO NURSING SUPERVISOR FROM [place] CALLED WITH A ROOM FOR PT (256A). CALL BACK NUMBER 
FOR REPORT [phone]. TEXTED MD FOR DISCHARGE ORDER.

## 2022-05-06 NOTE — NUR
RETAKE OF B/P /57. PT HAS NO S/S OF PAIN OR DISTRESS NOTED. DISCHARGE PAPERS PRINTED. 
[facility] CALLED WILL P/U IN ABOUT 30MINUTES.

## 2022-05-06 NOTE — NUR
[facility] HERE PT WAS ABLE TO VOID AND HAVE BM X1 SHE HAS NO C/O VOICED. REPORT WAS GIVEN TO 
ANGELA SOUZA NURSE , PT WILL BE GOING TO ROOM 256A. PT LEFT WITH ALL DISCHARGE PAPERS AND 
BELONGINGS IN HAND.